# Patient Record
Sex: MALE | Race: ASIAN
[De-identification: names, ages, dates, MRNs, and addresses within clinical notes are randomized per-mention and may not be internally consistent; named-entity substitution may affect disease eponyms.]

---

## 2017-06-23 ENCOUNTER — HOSPITAL ENCOUNTER (EMERGENCY)
Dept: HOSPITAL 45 - C.EDB | Age: 27
Discharge: HOME | End: 2017-06-23
Payer: COMMERCIAL

## 2017-06-23 VITALS
HEIGHT: 67.01 IN | WEIGHT: 147.71 LBS | HEIGHT: 67.01 IN | BODY MASS INDEX: 23.18 KG/M2 | WEIGHT: 147.71 LBS | BODY MASS INDEX: 23.18 KG/M2

## 2017-06-23 VITALS — OXYGEN SATURATION: 95 % | SYSTOLIC BLOOD PRESSURE: 115 MMHG | DIASTOLIC BLOOD PRESSURE: 62 MMHG | HEART RATE: 88 BPM

## 2017-06-23 VITALS — TEMPERATURE: 98.42 F
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
## 2017-06-23 DIAGNOSIS — S05.01XA: Primary | ICD-10-CM

## 2017-06-23 NOTE — EMERGENCY ROOM VISIT NOTE
ED Visit Note


First contact with patient:  00:49


CHIEF COMPLAINT:  Eye pain








HISTORY OF PRESENT ILLNESS:  This 27 yo patient presents to the emergency 

department with friend complaining of pain in the right eye for the past day. 

There has been a constant moderate pain and irritation, redness and tearing in 

the eye.  There is a mild blurring of vision at times and light bothers the 

eye.  The vision has not been decreased over all.  The patient does not wear 

contacts.  The patient rates the pain as irritating and 5/10.  The patient has 

not had previous injuries to this eye.  Tetanus shot is  up to date.








REVIEW OF SYSTEMS: A 6 system review of systems was completed with positives 

and pertinent negatives listed in the HPI. 








ALLERGIES:none








MEDICATIONS:none








PMH:  none








SOCIAL HISTORY: No drug use








PHYSICAL EXAM: Vital Signs: Reviewed Nurse's notes, vital signs stable.  Visual 

acuity reviewed from nursing.  GENERAL: This is a pleasant male, in no acute 

distress, but who is uncomfortable from the eye problem.  Well-developed well-

nourished.  EYES:  The pupils are equal round and reactive to light and 

accommodation.  EOMs are full and without tenderness.  There is discharge of 

clear tears from the right eye which is injected.  There is no foreign body 

visible under the eyelid even after lid eversion.  Funduscopic exam reveals no 

hemorrhages, papilledema, or other abnormalities.  No foreign body was seen 

embedded in the cornea under slit lamp exam.  The cornea was clear and no 

hyphema was seen.  Fluorescein uptake was observed with ultraviolet light 

significant for a corneal abrasion 6:00.








EMERGENCY DEPARTMENT COURSE: I examined the patient.  Alcaine 2 drops were 

placed in the patient's right eye.  A slit lamp exam was performed as above.  

Ciloxan two drops was placed in the patient's right eye.  The patient was 

discharged home in good condition.





 


DIAGNOSIS:  Corneal abrasion of the right eye








DISCHARGE INSTRUCTIONS AND TREATMENT:   as below


Current/Historical Medications


No Active Prescriptions or Reported Meds





Allergies


Coded Allergies:  


     No Known Allergies (Unverified , 6/23/17)





Vital Signs











  Date Time  Temp Pulse Resp B/P (MAP) Pulse Ox O2 Delivery O2 Flow Rate FiO2


 


6/23/17 01:31  88 14 115/62 95   


 


6/23/17 00:37 36.9 83 17 108/78 96 Room Air  











Medications Administered











 Medications


  (Trade)  Dose


 Ordered  Sig/Soumya


 Route  Start Time


 Stop Time Status Last Admin


Dose Admin


 


 Proparacaine HCl


  (Alcaine 0.5%


 Oph Soln)  2 drops  NOW  STAT


 OP  6/23/17 00:58


 6/23/17 00:59 DC 6/23/17 00:58


2 DROPS


 


 Ciprofloxacin HCl


  (Ciprofloxacin


 0.3% Op Soln)  2 drops  NOW  STAT


 OP  6/23/17 01:19


 6/23/17 01:21 DC 6/23/17 01:28


2 DROPS











Departure Information


Impression





 Primary Impression:  


 Right corneal abrasion





Dispostion


Home / Self-Care





Condition


GOOD





Prescriptions





No Active Prescriptions or Reported Meds





Referrals


Oswaldo Ward D.O.





Forms


WORK / SCHOOL INSTRUCTIONS, HOME CARE DOCUMENTATION FORM,                      

                                          IMPORTANT VISIT INFORMATION





Patient Instructions


[facility], ED Eye Injury Corneal Abrasion





Additional Instructions





Ciloxan 2 drops into affected eye every 2 hrs while awake x 2 days then every 4 

hrs for 5 days for a total of 7 days.  No contacts for 10 days.  Do not rub 

your eyes, and wash hands frequently.  Cool compress for discomfort.  Avoid 

irritants like smoke, wind, and sun.  Throw out eye cosmetics.  





Follow up with family doctor or eye doctor if symptoms do not start to improve 

in 48 hrs or if not resolved in 7 days. 





Return sooner for any change in vision.

## 2018-03-21 ENCOUNTER — HOSPITAL ENCOUNTER (EMERGENCY)
Dept: HOSPITAL 45 - C.EDB | Age: 28
Discharge: HOME | End: 2018-03-21
Payer: COMMERCIAL

## 2018-03-21 VITALS — TEMPERATURE: 98.06 F

## 2018-03-21 VITALS — SYSTOLIC BLOOD PRESSURE: 118 MMHG | OXYGEN SATURATION: 96 % | HEART RATE: 80 BPM | DIASTOLIC BLOOD PRESSURE: 74 MMHG

## 2018-03-21 VITALS
WEIGHT: 155.65 LBS | HEIGHT: 67.01 IN | HEIGHT: 67.01 IN | WEIGHT: 155.65 LBS | BODY MASS INDEX: 24.43 KG/M2 | BODY MASS INDEX: 24.43 KG/M2

## 2018-03-21 DIAGNOSIS — H16.142: Primary | ICD-10-CM

## 2018-03-22 NOTE — EMERGENCY ROOM VISIT NOTE
ED Visit Note


First contact with patient:  18:49


Chief Complaint: Left eye pain.





History of Present Illness: Mr. Power is a 27-year-old white male who ambulates 

into the ED accompanied by female friend complaining of left eye pain.


Patient reports he has been having ongoing eye pain for the last 2 days.  He 

reports initially it was mild has gradually increased in intensity.  He denies 

any precipitating injuries, he wears glasses but not contacts lenses.  

Additionally denies any previous significant eye injuries or diseases.  He does 

report last year he had a corneal abrasion of the right eye from unknown causes.


Currently patient is complaining of a burning and light sensitivity to the left 

eye.  He rates his discomfort 5/10.  His pain is nonradiating.  He has not 

identified any aggravating or alleviating factors related to the pain.  He has 

not taken any medications for pain prior to arrival at this hospital.  

Associated with his pain he reports he is having tearing, redness of the sclera 

and light sensitivity.


Additionally patient does report he has been noted by family members to 

spontaneously just rub his eyes at night while he is sleeping.


He denies fevers, chills, sweats, skin eruptions, skin color changes, headache, 

flashing lights, halos, floaters, nausea/vomiting.





Review of Systems: As noted above in history of present illness. 





Past Medical History: As previously noted.


Current Medications: Patient denies.


Allergies to Medications: Patient denies.


Social History: Patient is currently employed; he feels safe in his home 

environment; he denies tobacco use and admits to alcohol use.


Tetanus Immunization Status: Up-to-date.





Physical Examination:


Vital Signs: 








  Date Time  Temp Pulse Resp B/P (MAP) Pulse Ox O2 Delivery O2 Flow Rate FiO2


 


3/21/18 20:15  80  118/74 96   


 


3/21/18 18:46 36.7 92 17 119/82 96 Room Air  





GENERAL: 27-year-old male in mild to moderate distress due to pain, nontoxic-

appearing, afebrile and hemodynamically stable.


NEUROLOGICAL: Awake, alert and oriented to person, place and time.  Answering 

questions appropriately and following commands.  Normal gait.  Good hand eye 

coordination.  


SKIN: Warm, dry and pink.  No soft tissue eruptions or trauma noted.


HEENT:  Atraumatic and normocephalic.  The orbit is not erythematous or 

edematous.  The tissue surrounding the left eye are not tender to palpation.  

PERRLA.  EOMI without nystagmus.  Sclera injected on the left but not the 

right.  Conjunctiva is pink with drainage of tears on the left.  No foreign 

bodies noted under the eyelids are embedded in the cornea.  The anterior 

chamber is clear.  Visual acuity right: 20/20 with corrective glasses, left 20/

100 with corrective glasses.  On slit-lamp examination with staining patient 

has a punctated uptake of the staining diffusely over the cornea.  This is mild 

and not disseminated in one area only.





ED Course:


Patient is assessed as noted above.


Patient's medication list was reviewed.


Alcaine was used to anesthetize the eye for examination.


Patient was educated about today's findings and instructed on his treatment plan

; he verbalizes understanding and agreement with this plan.





Clinical Impression: Left corneal punctate keratitis.





Disposition: Patient discharged home in stable condition accompanied by his 

girlfriend; prior to departure he was reassessed and subjectively reported he 

was feeling better and rated his discomfort 3/10.





Plan:


Patient was placed on a sliding pain medication scale of ibuprofen, 

acetaminophen and Norco; his name was checked on state database and no red 

flags were noted and he was given appropriate narcotic precautions.


Patient was prescribed Ciloxan ophthalmic solution encouraged to use 2 drops 

every 4 hours while awake for 5 days.


Patient was encouraged to follow-up with his ophthalmologist to return the 

emergency department in 36-48 hours for recheck.


Patient was encouraged return to the ED for worsening/uncontrolled pain, fevers

, worsening visual changes, headaches, vomiting or any new/concerning symptoms.

## 2018-03-26 ENCOUNTER — HOSPITAL ENCOUNTER (EMERGENCY)
Dept: HOSPITAL 45 - C.EDB | Age: 28
Discharge: HOME | End: 2018-03-26
Payer: COMMERCIAL

## 2018-03-26 VITALS — HEART RATE: 82 BPM | OXYGEN SATURATION: 99 % | DIASTOLIC BLOOD PRESSURE: 68 MMHG | SYSTOLIC BLOOD PRESSURE: 119 MMHG

## 2018-03-26 VITALS — TEMPERATURE: 98.24 F

## 2018-03-26 DIAGNOSIS — H57.12: Primary | ICD-10-CM

## 2018-03-26 DIAGNOSIS — H53.8: ICD-10-CM

## 2018-03-26 NOTE — EMERGENCY ROOM VISIT NOTE
ED Visit Note


First contact with patient:  12:57


Chief Complaint: Left eye pain and vision changes.





History of Present Illness: Mr. Power is a 27-year-old male who ambulates into 

the ED complaining of left eye pain and vision changes.


Historically patient was seen in the emergency department in June 2017 with 

right eye pain and was found to have a corneal abrasion.  Additionally I had 

seen this patient in the emergency department on March 21, 2018 for similar 

symptoms.  He was found to have a punctated uptake of fluroescein diffusely 

over the inferior border of the cornea.  He was placed on ciprofloxacin 

antibiotic drops and pain medications.  He goes on to report that he was 

feeling much better for approximately 2.5 days and then his pain started to re-

escalate and he started having worsening vision changes.


Currently he is complaining of a stinging burning sensation in the left eye.  

He rates his discomfort 7/10.  His pain is nonradiating.  His pain worsens with 

exposure to light.  He has not identified any alleviating factors related to 

the pain.  Currently he describes his vision changes as a fog over the entire 

left eye.  He reports he is able to perceive light and movement but cannot see 

anything else.


He denies fevers, chills, sweats, skin eruptions, skin color changes, recent 

additional trauma to his eye, contact use, flashing lights, drawing curtains, 

floaters, vomiting, decreased appetite.





Review of Systems: As noted above in history of present illness. 8 body systems 

were reviewed and found to be negative as noted above.





Past Medical History: As previously noted.


Current Medications: Ciloxan, Norco.


Allergies to Medications: Patient denies.


Social History: Patient is a university student; he feels safe in his home 

environment; he denies tobacco and alcohol use.





Physical Examination:


Vital Signs: 








  Date Time  Temp Pulse Resp B/P (MAP) Pulse Ox O2 Delivery O2 Flow Rate FiO2


 


3/26/18 14:06  82 20 119/68 99   


 


3/26/18 12:59 36.8 86 20 122/77 98 Room Air  





GENERAL: 27-year-old male in mild distress due to pain, nontoxic-appearing, 

afebrile and hemodynamically stable.


NEUROLOGICAL: Awake, alert and oriented to person, place and time.  Answering 

questions appropriately and following commands.  


SKIN: Warm, dry and pink.  No soft tissue eruptions or trauma noted.


HEENT:  Atraumatic and normocephalic.  PERRLA.  EOMI without nystagmus.  Sclera 

significantly injected.  Conjunctiva pink without drainage.  No foreign bodies 

were noted under the eyelids are embedded in the cornea.  Anterior chamber is 

clear.  On slit-lamp examination is hazy in color but does not uptake of dye.  

Ocular pressure was 22.3 on the right and 23.0 on the left.  Visual acuity: 

Right 20/40 with correction and he reports he was not able to see the chart at 

all with his left eye.  





ED Course:


Patient is assessed as noted above.


Patient's medication list was reviewed.


Alcaine was used to anesthetize the eye for examination.


Patient's case was reviewed with Dr. Vela, ophthalmologist; he reported 

that the patient be seen in his office immediately for follow-up care and 

treatment.


Patient was educated about today's findings and instructed on his treatment plan

; he verbalized understanding and agreement with this plan.





Clinical Impression: Left eye pain with visual acuities.





Disposition: Patient discharged home in stable condition; prior to departure he 

was reassessed and subjectively reported he was feeling better and rated his 

discomfort 5/10.





Plan:


Patient was encouraged to follow-up with Dr. Garcia for 3:15 PM appointment 

today.


Patient was encouraged to return the ED as needed.